# Patient Record
Sex: FEMALE | Race: WHITE | NOT HISPANIC OR LATINO | URBAN - METROPOLITAN AREA
[De-identification: names, ages, dates, MRNs, and addresses within clinical notes are randomized per-mention and may not be internally consistent; named-entity substitution may affect disease eponyms.]

---

## 2024-09-30 ENCOUNTER — TRANSCRIBE ORDERS (OUTPATIENT)
Dept: ADMINISTRATIVE | Facility: HOSPITAL | Age: 44
End: 2024-09-30
Payer: COMMERCIAL

## 2024-09-30 DIAGNOSIS — M51.369 DISC DEGENERATION, LUMBAR: Primary | ICD-10-CM

## 2024-10-24 ENCOUNTER — HOSPITAL ENCOUNTER (OUTPATIENT)
Dept: MRI IMAGING | Facility: HOSPITAL | Age: 44
Discharge: HOME | End: 2024-10-24
Admitting: INTERNAL MEDICINE
Payer: COMMERCIAL

## 2024-10-24 DIAGNOSIS — M51.369 DISC DEGENERATION, LUMBAR: ICD-10-CM

## 2024-10-24 PROCEDURE — 72148 MRI LUMBAR SPINE W/O DYE: CPT

## 2024-11-14 ENCOUNTER — TELEPHONE (OUTPATIENT)
Dept: PAIN MEDICINE | Facility: CLINIC | Age: 44
End: 2024-11-14
Payer: COMMERCIAL

## 2024-11-14 NOTE — TELEPHONE ENCOUNTER
Caller: CASIMIRO   Relationship to Patient: SELF  Phone Number: [phone]  Reason for Call: PATINT CALLED DARCY BACK ATTEMTPED TO [facility], SHE WAS IN REFERRAL SO UNABLE TO LEAVE NOTE IN REFERRAL

## 2025-06-12 ENCOUNTER — TELEPHONE (OUTPATIENT)
Age: 45
End: 2025-06-12
Payer: COMMERCIAL

## 2025-06-12 NOTE — TELEPHONE ENCOUNTER
Caller: karley irvin    Relationship to patient: Mother    Best call back number: [phone]    Chief complaint: CHEST PAIN/DISCOMFORT, SOB, DIZZINESS, EDEMA IN FEET AND HANDS. LOW BLOOD PRESSURE AND LOW HEART RATE     Type of visit: FU    Requested date: ASAP       Additional notes: STATES THEY SAW DR. GARCIA BEFORE THE [facility] [place] [facility]

## 2025-07-30 PROBLEM — E78.5 HYPERLIPIDEMIA LDL GOAL <70: Status: ACTIVE | Noted: 2025-07-30

## 2025-07-30 PROBLEM — I25.10 CORONARY ARTERY DISEASE INVOLVING NATIVE CORONARY ARTERY OF NATIVE HEART WITHOUT ANGINA PECTORIS: Status: ACTIVE | Noted: 2025-07-30

## 2025-07-30 PROBLEM — I10 BENIGN ESSENTIAL HYPERTENSION: Status: ACTIVE | Noted: 2025-07-30

## 2025-07-30 PROBLEM — I34.0 NONRHEUMATIC MITRAL VALVE REGURGITATION: Status: ACTIVE | Noted: 2025-07-30

## 2025-08-01 ENCOUNTER — TELEPHONE (OUTPATIENT)
Age: 45
End: 2025-08-01